# Patient Record
Sex: MALE | Race: WHITE
[De-identification: names, ages, dates, MRNs, and addresses within clinical notes are randomized per-mention and may not be internally consistent; named-entity substitution may affect disease eponyms.]

---

## 2019-08-29 ENCOUNTER — TRANSCRIPTION ENCOUNTER (OUTPATIENT)
Age: 19
End: 2019-08-29

## 2019-11-05 ENCOUNTER — APPOINTMENT (OUTPATIENT)
Dept: INTERNAL MEDICINE | Facility: CLINIC | Age: 19
End: 2019-11-05
Payer: COMMERCIAL

## 2019-11-05 VITALS
OXYGEN SATURATION: 98 % | DIASTOLIC BLOOD PRESSURE: 80 MMHG | HEART RATE: 68 BPM | WEIGHT: 186.13 LBS | HEIGHT: 72 IN | BODY MASS INDEX: 25.21 KG/M2 | TEMPERATURE: 98 F | SYSTOLIC BLOOD PRESSURE: 124 MMHG

## 2019-11-05 DIAGNOSIS — Z87.09 PERSONAL HISTORY OF OTHER DISEASES OF THE RESPIRATORY SYSTEM: ICD-10-CM

## 2019-11-05 PROBLEM — Z00.00 ENCOUNTER FOR PREVENTIVE HEALTH EXAMINATION: Status: ACTIVE | Noted: 2019-11-05

## 2019-11-05 PROCEDURE — 99203 OFFICE O/P NEW LOW 30 MIN: CPT

## 2019-11-05 RX ORDER — AMOXICILLIN 875 MG/1
875 TABLET, FILM COATED ORAL
Qty: 14 | Refills: 0 | Status: ACTIVE | COMMUNITY
Start: 2019-11-05 | End: 1900-01-01

## 2019-11-05 NOTE — HISTORY OF PRESENT ILLNESS
[FreeTextEntry1] : problems with rt eye / sore throat [de-identified] : Mr. IGOR WAY is a 19 year male with a history of contact girlfriend with strep throat, comes with sore throat x 2 days.  No other complaints at this time.

## 2019-11-05 NOTE — PHYSICAL EXAM
[No Acute Distress] : no acute distress [Well Nourished] : well nourished [Well Developed] : well developed [Well-Appearing] : well-appearing [Normal Sclera/Conjunctiva] : normal sclera/conjunctiva [PERRL] : pupils equal round and reactive to light [EOMI] : extraocular movements intact [Normal Outer Ear/Nose] : the outer ears and nose were normal in appearance [No JVD] : no jugular venous distention [No Lymphadenopathy] : no lymphadenopathy [Supple] : supple [Thyroid Normal, No Nodules] : the thyroid was normal and there were no nodules present [No Respiratory Distress] : no respiratory distress  [No Accessory Muscle Use] : no accessory muscle use [Clear to Auscultation] : lungs were clear to auscultation bilaterally [Normal Rate] : normal rate  [Regular Rhythm] : with a regular rhythm [Normal S1, S2] : normal S1 and S2 [No Murmur] : no murmur heard [No Carotid Bruits] : no carotid bruits [No Abdominal Bruit] : a ~M bruit was not heard ~T in the abdomen [No Varicosities] : no varicosities [Pedal Pulses Present] : the pedal pulses are present [No Edema] : there was no peripheral edema [No Palpable Aorta] : no palpable aorta [No Extremity Clubbing/Cyanosis] : no extremity clubbing/cyanosis [Soft] : abdomen soft [Non Tender] : non-tender [Non-distended] : non-distended [No Masses] : no abdominal mass palpated [No HSM] : no HSM [Normal Bowel Sounds] : normal bowel sounds [Normal Posterior Cervical Nodes] : no posterior cervical lymphadenopathy [Normal Anterior Cervical Nodes] : no anterior cervical lymphadenopathy [No CVA Tenderness] : no CVA  tenderness [No Spinal Tenderness] : no spinal tenderness [No Joint Swelling] : no joint swelling [Grossly Normal Strength/Tone] : grossly normal strength/tone [No Rash] : no rash [Coordination Grossly Intact] : coordination grossly intact [No Focal Deficits] : no focal deficits [Normal Gait] : normal gait [Deep Tendon Reflexes (DTR)] : deep tendon reflexes were 2+ and symmetric [Normal Affect] : the affect was normal [Normal Insight/Judgement] : insight and judgment were intact [de-identified] : erythematous pharynx, white exudates.

## 2019-11-05 NOTE — HEALTH RISK ASSESSMENT
[Good] : ~his/her~  mood as  good [0] : 2) Feeling down, depressed, or hopeless: Not at all (0) [[id number]] : 0

## 2019-11-05 NOTE — PLAN
[FreeTextEntry1] : history of contact girlfriend with strep throat, comes with sore throat x 2 days. Will start antibiotics empirically, Throat culture and RVP taken today.\par \par Counseling included abnormal lab results, differential diagnoses, treatment options, risks and benefits, lifestyle changes, prognosis, current condition, medications, and dose adjustments. \par The patient was interactive, attentive, asked questions, and verbalized understanding

## 2019-11-06 LAB — RAPID RVP RESULT: NOT DETECTED

## 2019-11-11 LAB — BACTERIA THROAT CULT: NORMAL

## 2019-11-26 ENCOUNTER — APPOINTMENT (OUTPATIENT)
Dept: INTERNAL MEDICINE | Facility: CLINIC | Age: 19
End: 2019-11-26

## 2020-12-21 PROBLEM — Z87.09 HISTORY OF ACUTE PHARYNGITIS: Status: RESOLVED | Noted: 2019-11-05 | Resolved: 2020-12-21

## 2022-01-06 ENCOUNTER — APPOINTMENT (OUTPATIENT)
Dept: INTERNAL MEDICINE | Facility: CLINIC | Age: 22
End: 2022-01-06

## 2022-03-08 ENCOUNTER — TRANSCRIPTION ENCOUNTER (OUTPATIENT)
Age: 22
End: 2022-03-08

## 2022-08-31 ENCOUNTER — NON-APPOINTMENT (OUTPATIENT)
Age: 22
End: 2022-08-31

## 2022-11-02 ENCOUNTER — APPOINTMENT (OUTPATIENT)
Dept: FAMILY MEDICINE | Facility: CLINIC | Age: 22
End: 2022-11-02

## 2022-11-06 ENCOUNTER — NON-APPOINTMENT (OUTPATIENT)
Age: 22
End: 2022-11-06

## 2022-11-22 ENCOUNTER — APPOINTMENT (OUTPATIENT)
Dept: INTERNAL MEDICINE | Facility: CLINIC | Age: 22
End: 2022-11-22

## 2024-02-21 ENCOUNTER — APPOINTMENT (OUTPATIENT)
Dept: ORTHOPEDIC SURGERY | Facility: CLINIC | Age: 24
End: 2024-02-21
Payer: COMMERCIAL

## 2024-02-21 VITALS
SYSTOLIC BLOOD PRESSURE: 124 MMHG | TEMPERATURE: 98.1 F | WEIGHT: 185 LBS | DIASTOLIC BLOOD PRESSURE: 87 MMHG | HEART RATE: 66 BPM | BODY MASS INDEX: 25.06 KG/M2 | HEIGHT: 72 IN

## 2024-02-21 DIAGNOSIS — Z87.891 PERSONAL HISTORY OF NICOTINE DEPENDENCE: ICD-10-CM

## 2024-02-21 DIAGNOSIS — M25.562 PAIN IN RIGHT KNEE: ICD-10-CM

## 2024-02-21 DIAGNOSIS — M79.671 PAIN IN RIGHT FOOT: ICD-10-CM

## 2024-02-21 DIAGNOSIS — M25.551 PAIN IN RIGHT HIP: ICD-10-CM

## 2024-02-21 DIAGNOSIS — M25.561 PAIN IN RIGHT KNEE: ICD-10-CM

## 2024-02-21 DIAGNOSIS — Z78.9 OTHER SPECIFIED HEALTH STATUS: ICD-10-CM

## 2024-02-21 DIAGNOSIS — M72.2 PLANTAR FASCIAL FIBROMATOSIS: ICD-10-CM

## 2024-02-21 DIAGNOSIS — M79.672 PAIN IN RIGHT FOOT: ICD-10-CM

## 2024-02-21 PROCEDURE — 73522 X-RAY EXAM HIPS BI 3-4 VIEWS: CPT

## 2024-02-21 PROCEDURE — 99203 OFFICE O/P NEW LOW 30 MIN: CPT

## 2024-02-21 PROCEDURE — 73562 X-RAY EXAM OF KNEE 3: CPT | Mod: RT

## 2024-02-21 NOTE — HISTORY OF PRESENT ILLNESS
[de-identified] : 02/21/2024 : IGOR WAY  is a 23 year  old male who presents to the office for evaluation of his bilateral knees, right hip, bilateral heel pain.  He states he used to work in the [employer] and now is a  and states since 2018 when he was in the service he has had pain in the bilateral heels, bilateral knees and right hip and groin that is worse certain activities and motions and alleviated with rest.  He states that he never had any acute traumatic injury and just attributes it to wearing tear from being on the job.  He states that he gets bilateral heel pain that is worse in the morning and gets better throughout the day and does not think he has not been able to deal with.  He states he finally thought he should get this looked at because of the chronicity of his symptoms.  He states he has had similar things with his knees in the anterior aspect of the bilateral knees right worse than left that is worse when he runs for long distances and squats and kneels and goes up and down inclines.  He states he also gets some pain in the groin that is worse with some activities such as running.  He states he has a scar that appeared on his inner thigh and groin that he is not sure if it is attributed to his pain.  He is here for specialist opinion.  He denies any numbness or tingling distally.  He has no other complaints today.

## 2024-02-21 NOTE — DISCUSSION/SUMMARY
[de-identified] : Assessment: Patient is a 23-year-old male with right knee pain secondary to patellar tendinitis, right hip pain secondary to CLARIBEL, bilateral heel pain secondary to plantar fasciitis  Plan: I had a long discussion with the patient today regarding the nature of their diagnosis and treatment plan. We discussed the risks and benefits of no treatment as well as nonoperative and operative treatments.  I reviewed the x-rays today that showed no acute bony pathology.  His hip films do suggest CLARIBEL.  On examination he has good range of motion and strength of hip, knees, and ankles.  At this time I recommend conservative treatment of the patient's conditions with modalities including rest, ice, heat, anti-inflammatory medications, activity modifications, and home stretching and strengthening exercises. I discussed with the patient the risks and benefits associated with NSAID use. GI precautions were discussed.  A referral for physical therapy was provided to begin working on exercises to help improve their strength and function.  Recommend follow-up in 8 weeks for repeat clinical assessment of the hip and knee.  If he continues to have pain in 1 of these areas you may consider an MRI.  Regarding his heel pain, we also discussed obtaining over-the-counter heel cups.  He will also follow-up with podiatry for any further recommendations.   The patient verbalizes their understanding and agrees with the plan.  All questions were answered to their satisfaction.

## 2024-02-21 NOTE — PHYSICAL EXAM
[de-identified] : General: Awake, alert, no acute distress, Patient was cooperative and appropriate during the examination.  The patient is of normal weight for height and age.  Walks without an antalgic gait.  Bilateral knees Examination: Physical examination of the knee demonstrates normal skin without signs of skin changes or abnormalities. No erythema, warmth, or joint effusion is appreciated .   Sensation is intact to light touch L2-S1 Palpable DP/PT pulse EHL/FHL/TA/GSC motor function intact   Range of Motion 0-130 degrees   Strength Testing Quadriceps/Hamstring 5/5 Patient is able to perform a straight leg raise without difficulty.   Palpation Not tender to palpation about the distal femur, proximal tibia, or patella No palpable defect appreciated in the quadriceps or patellar tendons Not tender to palpation of medial joint line Not tender to palpation of lateral joint line Mildly tender in the patellofemoral compartment bilaterally, particularly over the inferior pole of the patella   Special Tests Anterior Drawer negative Posterior Drawer negative Lachman Exam negative No Varus or Valgus Laxity at 0 or 30 degrees of knee flexion Jacob's Test negative for pain or crepitus Active compression of the patella negative for pain or crepitus Translation of the patella 2 quadrants with a firm endpoint  Bilateral ankles Examination: Physical examination of the ankle demonstrates normal skin without signs of skin changes or abnormalities. No erythema, warmth, or joint effusion is appreciated.    Sensation is intact to light touch L2-S1 Palpable DP/PT pulse EHL/FHL/TA/GSC motor function intact   Range of Motion: Dorsiflexion 20 Plantarflexion 45 Inversion 30 Eversion 20   Strength Testing Dorsiflexion 5/5 Plantarflexion 5/5 Inversion 5/5 Eversion 5/5   Palpation Not tender to palpation over the lateral malleolus Not tender to palpation over the medial malleolus Not tender to palpation over the ATFL, CFL, PTFL Not tender to palpation over the calcaneal tuberosity Not tender to palpation over the peroneal tendons Not tender to palpation over the tarsals, metatarsals, or phalanges Not palpable defect within the achilles tendon Mildly tender over the plantar fascia over the heel of the bilateral feet   Special Tests: Ankle anterior drawer negative Squeeze test negative Gutierrez's test negative  Right hip Examination: Physical examination of the hip demonstrates normal skin without signs of skin changes or abnormalities. No erythema, warmth, or joint effusion is appreciated.  Patient has well-healed scars on the proximal inner groin bilaterally without signs of infection.   Sensation is intact to light touch L2-S1 Palpable DP/PT pulse EHL/FHL/TA/GSC motor function intact   Range of Motion 130 degrees of flexion to full extension 30 degrees of internal rotation 70 degrees of external rotation 45 degrees of hip abduction 20 degrees of hip adduction   Strength Testing Hip Flexors/Hip Extensors 5/5 Hip Abductors/Hip Adductors 5/5 Quadriceps/Hamstring 5/5 Patient is able to perform a straight leg raise without difficulty.   Palpation Not tender to palpation about the greater trochanter Mildly tender to palpation about the hip joint Not tender to palpation about the pubic symphysis Not tender to palpation about the rectus and conjoint tendon insertions Not tender to palpation about the adductor longus tendon origin   Special Tests LACHO test positive for pain in the groin FADIR test negative Matson's test negative Divine test negative Straight leg raise test negative     [de-identified] : X-rays 3 views of the right knee taken in the office today on 2/21/2024 shows no acute fracture or dislocation.  X-rays 3 views of the right hip and pelvis taken in the office today on 2/21/2024 shows no acute fracture or dislocation.  Patient does have cam deformities bilaterally suggestive of CLARIBEL.